# Patient Record
Sex: FEMALE | Race: BLACK OR AFRICAN AMERICAN
[De-identification: names, ages, dates, MRNs, and addresses within clinical notes are randomized per-mention and may not be internally consistent; named-entity substitution may affect disease eponyms.]

---

## 2019-10-15 ENCOUNTER — HOSPITAL ENCOUNTER (OUTPATIENT)
Dept: HOSPITAL 35 - RAD | Age: 54
End: 2019-10-15
Attending: FAMILY MEDICINE
Payer: COMMERCIAL

## 2019-10-15 DIAGNOSIS — Z12.31: Primary | ICD-10-CM

## 2020-10-14 ENCOUNTER — HOSPITAL ENCOUNTER (OUTPATIENT)
Dept: HOSPITAL 35 - CAT | Age: 55
End: 2020-10-14
Attending: FAMILY MEDICINE
Payer: COMMERCIAL

## 2020-10-14 DIAGNOSIS — I25.10: ICD-10-CM

## 2020-10-14 DIAGNOSIS — Z13.6: Primary | ICD-10-CM

## 2020-10-14 DIAGNOSIS — E78.00: ICD-10-CM

## 2020-11-17 NOTE — EKG
[facility]
[address]   [address]                     ELECTROCARDIOGRAM REPORT      
_______________________________________________________________________________
 
Name:       ZACHARIAH BALTAZAR       Room #:                     [facility]#:      [id number]                       Account #:      [id number]  
Admission:  20    Attend Phys:                          
Discharge:  20    Date of Birth:  [birth year]  
                                                          Report #: [id number]
                                                                    [id number]
_______________________________________________________________________________
THIS REPORT FOR:  
 
cc:  Elliot Belcher James A. DO Lundgren, Craig H. MD [facility]                                        ~
THIS REPORT FOR:   //name//                          
 
                         [facility] ED
                                       
Test Date:    2020               Test Time:    20:22:32
Pat Name:     ZACHARIAH CRUZ    Department:   
Patient ID:   [id number]            Room:          
Gender:       F                        Technician:   PARISA
:          1965               Requested By: Radha Schroeder
Order Number: [id number] MD:   Vinny Bustamante
                                 Measurements
Intervals                              Axis          
Rate:         103                      P:            48
ND:           139                      QRS:          -19
QRSD:         69                       T:            32
QT:           326                                    
QTc:          427                                    
                           Interpretive Statements
Sinus tachycardia
Inferior infarct, old
Compared to ECG 1998 13:40:00
No significant change was found
Electronically Signed On 2020 7:35:42 CST by Vinny Bustamante
[email or web address]
 
 
 
 
 
 
 
 
 
 
 
 
 
 
 
  <ELECTRONICALLY SIGNED>
   By: Vinny Bustamante MD, [facility]   
  20     0735
D: 20                           _____________________________________
T: 20                           Vinny Bustamante MD, [facility]     /EPI